# Patient Record
Sex: MALE | Race: WHITE | HISPANIC OR LATINO | URBAN - METROPOLITAN AREA
[De-identification: names, ages, dates, MRNs, and addresses within clinical notes are randomized per-mention and may not be internally consistent; named-entity substitution may affect disease eponyms.]

---

## 2017-12-06 ENCOUNTER — OUTPATIENT (OUTPATIENT)
Dept: OUTPATIENT SERVICES | Facility: HOSPITAL | Age: 57
LOS: 1 days | End: 2017-12-06
Payer: COMMERCIAL

## 2017-12-06 VITALS
DIASTOLIC BLOOD PRESSURE: 94 MMHG | HEIGHT: 64 IN | SYSTOLIC BLOOD PRESSURE: 150 MMHG | TEMPERATURE: 97 F | RESPIRATION RATE: 16 BRPM | WEIGHT: 188.27 LBS | HEART RATE: 80 BPM | OXYGEN SATURATION: 96 %

## 2017-12-06 DIAGNOSIS — K43.9 VENTRAL HERNIA WITHOUT OBSTRUCTION OR GANGRENE: ICD-10-CM

## 2017-12-06 DIAGNOSIS — Z01.818 ENCOUNTER FOR OTHER PREPROCEDURAL EXAMINATION: ICD-10-CM

## 2017-12-06 LAB
HCT VFR BLD CALC: 50.5 % — HIGH (ref 39–50)
HGB BLD-MCNC: 16.3 G/DL — SIGNIFICANT CHANGE UP (ref 13–17)
MCHC RBC-ENTMCNC: 29.4 PG — SIGNIFICANT CHANGE UP (ref 27–34)
MCHC RBC-ENTMCNC: 32.2 GM/DL — SIGNIFICANT CHANGE UP (ref 32–36)
MCV RBC AUTO: 91.2 FL — SIGNIFICANT CHANGE UP (ref 80–100)
PLATELET # BLD AUTO: 227 K/UL — SIGNIFICANT CHANGE UP (ref 150–400)
RBC # BLD: 5.54 M/UL — SIGNIFICANT CHANGE UP (ref 4.2–5.8)
RBC # FLD: 12.9 % — SIGNIFICANT CHANGE UP (ref 10.3–14.5)
WBC # BLD: 7.2 K/UL — SIGNIFICANT CHANGE UP (ref 3.8–10.5)
WBC # FLD AUTO: 7.2 K/UL — SIGNIFICANT CHANGE UP (ref 3.8–10.5)

## 2017-12-06 PROCEDURE — 36415 COLL VENOUS BLD VENIPUNCTURE: CPT

## 2017-12-06 PROCEDURE — 93005 ELECTROCARDIOGRAM TRACING: CPT

## 2017-12-06 PROCEDURE — G0463: CPT

## 2017-12-06 PROCEDURE — 93010 ELECTROCARDIOGRAM REPORT: CPT | Mod: NC

## 2017-12-06 PROCEDURE — 85027 COMPLETE CBC AUTOMATED: CPT

## 2017-12-06 NOTE — H&P PST ADULT - HISTORY OF PRESENT ILLNESS
this is a 56 y/o male who noticed a lump umbilical area about 10 years ago; surgeon suggested repair, but patient had no time; the lump got bigger and now patient has the time for the surgery which will be hernia repair

## 2017-12-06 NOTE — H&P PST ADULT - NSANTHOSAYNRD_GEN_A_CORE
No. SOLEDAD screening performed.  STOP BANG Legend: 0-2 = LOW Risk; 3-4 = INTERMEDIATE Risk; 5-8 = HIGH Risk

## 2017-12-06 NOTE — H&P PST ADULT - FAMILY HISTORY
Mother  Still living? No  Family history of stomach cancer, Age at diagnosis: Age Unknown     Father  Still living? No  Family history of stroke, Age at diagnosis: Age Unknown

## 2017-12-11 NOTE — ASU DISCHARGE PLAN (ADULT/PEDIATRIC). - MEDICATION SUMMARY - MEDICATIONS TO TAKE
I will START or STAY ON the medications listed below when I get home from the hospital:    oxyCODONE-acetaminophen 5 mg-325 mg oral tablet  -- 1 tab(s) by mouth every 6 hours, As Needed -for moderate pain MDD:4   -- Caution federal law prohibits the transfer of this drug to any person other  than the person for whom it was prescribed.  May cause drowsiness.  Alcohol may intensify this effect.  Use care when operating dangerous machinery.  This prescription cannot be refilled.  This product contains acetaminophen.  Do not use  with any other product containing acetaminophen to prevent possible liver damage.  Using more of this medication than prescribed may cause serious breathing problems.    -- Indication: For pain

## 2017-12-11 NOTE — ASU DISCHARGE PLAN (ADULT/PEDIATRIC). - NOTIFY
Persistent Nausea and Vomiting/Inability to Tolerate Liquids or Foods/Swelling that continues/Bleeding that does not stop/Excessive Diarrhea/Increased Irritability or Sluggishness/Fever greater than 101/Pain not relieved by Medications/Unable to Urinate

## 2017-12-14 ENCOUNTER — TRANSCRIPTION ENCOUNTER (OUTPATIENT)
Age: 57
End: 2017-12-14

## 2017-12-14 ENCOUNTER — OUTPATIENT (OUTPATIENT)
Dept: OUTPATIENT SERVICES | Facility: HOSPITAL | Age: 57
LOS: 1 days | End: 2017-12-14
Payer: COMMERCIAL

## 2017-12-14 VITALS
HEIGHT: 64 IN | OXYGEN SATURATION: 98 % | DIASTOLIC BLOOD PRESSURE: 75 MMHG | WEIGHT: 181.22 LBS | RESPIRATION RATE: 16 BRPM | TEMPERATURE: 98 F | HEART RATE: 59 BPM | SYSTOLIC BLOOD PRESSURE: 127 MMHG

## 2017-12-14 VITALS
HEART RATE: 74 BPM | RESPIRATION RATE: 19 BRPM | OXYGEN SATURATION: 97 % | SYSTOLIC BLOOD PRESSURE: 118 MMHG | DIASTOLIC BLOOD PRESSURE: 79 MMHG

## 2017-12-14 DIAGNOSIS — Z98.890 OTHER SPECIFIED POSTPROCEDURAL STATES: Chronic | ICD-10-CM

## 2017-12-14 DIAGNOSIS — K43.9 VENTRAL HERNIA WITHOUT OBSTRUCTION OR GANGRENE: ICD-10-CM

## 2017-12-14 PROCEDURE — 49568: CPT | Mod: AS

## 2017-12-14 PROCEDURE — 49568: CPT

## 2017-12-14 PROCEDURE — C1781: CPT

## 2017-12-14 PROCEDURE — 49561: CPT

## 2017-12-14 PROCEDURE — 49561: CPT | Mod: AS

## 2017-12-14 RX ORDER — OXYCODONE HYDROCHLORIDE 5 MG/1
5 TABLET ORAL ONCE
Qty: 0 | Refills: 0 | Status: DISCONTINUED | OUTPATIENT
Start: 2017-12-14 | End: 2017-12-14

## 2017-12-14 RX ORDER — SODIUM CHLORIDE 9 MG/ML
1000 INJECTION, SOLUTION INTRAVENOUS
Qty: 0 | Refills: 0 | Status: DISCONTINUED | OUTPATIENT
Start: 2017-12-14 | End: 2017-12-15

## 2017-12-14 RX ORDER — HYDROMORPHONE HYDROCHLORIDE 2 MG/ML
0.5 INJECTION INTRAMUSCULAR; INTRAVENOUS; SUBCUTANEOUS
Qty: 0 | Refills: 0 | Status: DISCONTINUED | OUTPATIENT
Start: 2017-12-14 | End: 2017-12-15

## 2017-12-14 RX ORDER — CEFAZOLIN SODIUM 1 G
2000 VIAL (EA) INJECTION ONCE
Qty: 0 | Refills: 0 | Status: COMPLETED | OUTPATIENT
Start: 2017-12-14 | End: 2017-12-14

## 2017-12-14 RX ADMIN — OXYCODONE HYDROCHLORIDE 5 MILLIGRAM(S): 5 TABLET ORAL at 15:30

## 2017-12-14 RX ADMIN — SODIUM CHLORIDE 100 MILLILITER(S): 9 INJECTION, SOLUTION INTRAVENOUS at 14:09

## 2017-12-14 RX ADMIN — SODIUM CHLORIDE 100 MILLILITER(S): 9 INJECTION, SOLUTION INTRAVENOUS at 13:51

## 2017-12-14 RX ADMIN — OXYCODONE HYDROCHLORIDE 5 MILLIGRAM(S): 5 TABLET ORAL at 16:00

## 2017-12-14 RX ADMIN — HYDROMORPHONE HYDROCHLORIDE 0.5 MILLIGRAM(S): 2 INJECTION INTRAMUSCULAR; INTRAVENOUS; SUBCUTANEOUS at 14:25

## 2017-12-14 RX ADMIN — HYDROMORPHONE HYDROCHLORIDE 0.5 MILLIGRAM(S): 2 INJECTION INTRAMUSCULAR; INTRAVENOUS; SUBCUTANEOUS at 14:10

## 2017-12-20 ENCOUNTER — INPATIENT (INPATIENT)
Facility: HOSPITAL | Age: 57
LOS: 1 days | Discharge: HOME | End: 2017-12-22
Attending: HOSPITALIST | Admitting: HOSPITALIST
Payer: COMMERCIAL

## 2017-12-20 VITALS
TEMPERATURE: 99 F | DIASTOLIC BLOOD PRESSURE: 82 MMHG | SYSTOLIC BLOOD PRESSURE: 133 MMHG | OXYGEN SATURATION: 99 % | HEART RATE: 79 BPM | RESPIRATION RATE: 15 BRPM

## 2017-12-20 DIAGNOSIS — Z98.890 OTHER SPECIFIED POSTPROCEDURAL STATES: Chronic | ICD-10-CM

## 2017-12-20 NOTE — ED ADULT TRIAGE NOTE - CHIEF COMPLAINT QUOTE
Pt c/o nausea/vomiting/dizziness/lightheadedness and hiccups s/p hernia surgery approx 1 week ago.  Denies any fevers/chills at home.  Pt endorses epigastric pain.  Denies any SOB, respirations even and unlabored on room air.  PMHx:  hernia surgery, gout

## 2017-12-21 DIAGNOSIS — R06.6 HICCOUGH: ICD-10-CM

## 2017-12-21 DIAGNOSIS — R10.9 UNSPECIFIED ABDOMINAL PAIN: ICD-10-CM

## 2017-12-21 DIAGNOSIS — E78.5 HYPERLIPIDEMIA, UNSPECIFIED: ICD-10-CM

## 2017-12-21 DIAGNOSIS — K46.9 UNSPECIFIED ABDOMINAL HERNIA WITHOUT OBSTRUCTION OR GANGRENE: Chronic | ICD-10-CM

## 2017-12-21 DIAGNOSIS — Z29.9 ENCOUNTER FOR PROPHYLACTIC MEASURES, UNSPECIFIED: ICD-10-CM

## 2017-12-21 LAB
ALBUMIN SERPL ELPH-MCNC: 4.6 G/DL — SIGNIFICANT CHANGE UP (ref 3.3–5)
ALP SERPL-CCNC: 102 U/L — SIGNIFICANT CHANGE UP (ref 40–120)
ALT FLD-CCNC: 28 U/L — SIGNIFICANT CHANGE UP (ref 4–41)
APPEARANCE UR: CLEAR — SIGNIFICANT CHANGE UP
AST SERPL-CCNC: 24 U/L — SIGNIFICANT CHANGE UP (ref 4–40)
BASE EXCESS BLDV CALC-SCNC: 3.6 MMOL/L — SIGNIFICANT CHANGE UP
BASOPHILS # BLD AUTO: 0.06 K/UL — SIGNIFICANT CHANGE UP (ref 0–0.2)
BASOPHILS NFR BLD AUTO: 0.7 % — SIGNIFICANT CHANGE UP (ref 0–2)
BILIRUB SERPL-MCNC: 0.7 MG/DL — SIGNIFICANT CHANGE UP (ref 0.2–1.2)
BILIRUB UR-MCNC: NEGATIVE — SIGNIFICANT CHANGE UP
BLOOD GAS VENOUS - CREATININE: 0.98 MG/DL — SIGNIFICANT CHANGE UP (ref 0.5–1.3)
BLOOD UR QL VISUAL: NEGATIVE — SIGNIFICANT CHANGE UP
BUN SERPL-MCNC: 18 MG/DL — SIGNIFICANT CHANGE UP (ref 7–23)
CALCIUM SERPL-MCNC: 9.6 MG/DL — SIGNIFICANT CHANGE UP (ref 8.4–10.5)
CHLORIDE BLDV-SCNC: 103 MMOL/L — SIGNIFICANT CHANGE UP (ref 96–108)
CHLORIDE SERPL-SCNC: 97 MMOL/L — LOW (ref 98–107)
CO2 SERPL-SCNC: 27 MMOL/L — SIGNIFICANT CHANGE UP (ref 22–31)
COLOR SPEC: YELLOW — SIGNIFICANT CHANGE UP
CREAT SERPL-MCNC: 1.04 MG/DL — SIGNIFICANT CHANGE UP (ref 0.5–1.3)
EOSINOPHIL # BLD AUTO: 0.21 K/UL — SIGNIFICANT CHANGE UP (ref 0–0.5)
EOSINOPHIL NFR BLD AUTO: 2.4 % — SIGNIFICANT CHANGE UP (ref 0–6)
GAS PNL BLDV: 133 MMOL/L — LOW (ref 136–146)
GLUCOSE BLDV-MCNC: 118 — HIGH (ref 70–99)
GLUCOSE SERPL-MCNC: 113 MG/DL — HIGH (ref 70–99)
GLUCOSE UR-MCNC: NEGATIVE — SIGNIFICANT CHANGE UP
HCO3 BLDV-SCNC: 27 MMOL/L — SIGNIFICANT CHANGE UP (ref 20–27)
HCT VFR BLD CALC: 49.9 % — SIGNIFICANT CHANGE UP (ref 39–50)
HCT VFR BLDV CALC: 51.2 % — HIGH (ref 39–51)
HGB BLD-MCNC: 16.2 G/DL — SIGNIFICANT CHANGE UP (ref 13–17)
HGB BLDV-MCNC: 16.7 G/DL — SIGNIFICANT CHANGE UP (ref 13–17)
HYALINE CASTS # UR AUTO: SIGNIFICANT CHANGE UP (ref 0–?)
IMM GRANULOCYTES # BLD AUTO: 0.03 # — SIGNIFICANT CHANGE UP
IMM GRANULOCYTES NFR BLD AUTO: 0.3 % — SIGNIFICANT CHANGE UP (ref 0–1.5)
KETONES UR-MCNC: SIGNIFICANT CHANGE UP
LACTATE BLDV-MCNC: 1.2 MMOL/L — SIGNIFICANT CHANGE UP (ref 0.5–2)
LEUKOCYTE ESTERASE UR-ACNC: NEGATIVE — SIGNIFICANT CHANGE UP
LIDOCAIN IGE QN: 26.7 U/L — SIGNIFICANT CHANGE UP (ref 7–60)
LYMPHOCYTES # BLD AUTO: 1.73 K/UL — SIGNIFICANT CHANGE UP (ref 1–3.3)
LYMPHOCYTES # BLD AUTO: 19.6 % — SIGNIFICANT CHANGE UP (ref 13–44)
MCHC RBC-ENTMCNC: 28.7 PG — SIGNIFICANT CHANGE UP (ref 27–34)
MCHC RBC-ENTMCNC: 32.5 % — SIGNIFICANT CHANGE UP (ref 32–36)
MCV RBC AUTO: 88.3 FL — SIGNIFICANT CHANGE UP (ref 80–100)
MONOCYTES # BLD AUTO: 0.86 K/UL — SIGNIFICANT CHANGE UP (ref 0–0.9)
MONOCYTES NFR BLD AUTO: 9.8 % — SIGNIFICANT CHANGE UP (ref 2–14)
MUCOUS THREADS # UR AUTO: SIGNIFICANT CHANGE UP
NEUTROPHILS # BLD AUTO: 5.92 K/UL — SIGNIFICANT CHANGE UP (ref 1.8–7.4)
NEUTROPHILS NFR BLD AUTO: 67.2 % — SIGNIFICANT CHANGE UP (ref 43–77)
NITRITE UR-MCNC: NEGATIVE — SIGNIFICANT CHANGE UP
NRBC # FLD: 0 — SIGNIFICANT CHANGE UP
PCO2 BLDV: 43 MMHG — SIGNIFICANT CHANGE UP (ref 41–51)
PH BLDV: 7.43 PH — SIGNIFICANT CHANGE UP (ref 7.32–7.43)
PH UR: 5.5 — SIGNIFICANT CHANGE UP (ref 4.6–8)
PLATELET # BLD AUTO: 288 K/UL — SIGNIFICANT CHANGE UP (ref 150–400)
PMV BLD: 11.4 FL — SIGNIFICANT CHANGE UP (ref 7–13)
PO2 BLDV: 50 MMHG — HIGH (ref 35–40)
POTASSIUM BLDV-SCNC: 3.8 MMOL/L — SIGNIFICANT CHANGE UP (ref 3.4–4.5)
POTASSIUM SERPL-MCNC: 4.2 MMOL/L — SIGNIFICANT CHANGE UP (ref 3.5–5.3)
POTASSIUM SERPL-SCNC: 4.2 MMOL/L — SIGNIFICANT CHANGE UP (ref 3.5–5.3)
PROT SERPL-MCNC: 8.1 G/DL — SIGNIFICANT CHANGE UP (ref 6–8.3)
PROT UR-MCNC: NEGATIVE MG/DL — SIGNIFICANT CHANGE UP
RBC # BLD: 5.65 M/UL — SIGNIFICANT CHANGE UP (ref 4.2–5.8)
RBC # FLD: 12.8 % — SIGNIFICANT CHANGE UP (ref 10.3–14.5)
RBC CASTS # UR COMP ASSIST: SIGNIFICANT CHANGE UP (ref 0–?)
SAO2 % BLDV: 84.9 % — SIGNIFICANT CHANGE UP (ref 60–85)
SODIUM SERPL-SCNC: 138 MMOL/L — SIGNIFICANT CHANGE UP (ref 135–145)
SP GR SPEC: 1.02 — SIGNIFICANT CHANGE UP (ref 1–1.04)
SQUAMOUS # UR AUTO: SIGNIFICANT CHANGE UP
UROBILINOGEN FLD QL: NORMAL MG/DL — SIGNIFICANT CHANGE UP
WBC # BLD: 8.81 K/UL — SIGNIFICANT CHANGE UP (ref 3.8–10.5)
WBC # FLD AUTO: 8.81 K/UL — SIGNIFICANT CHANGE UP (ref 3.8–10.5)
WBC UR QL: SIGNIFICANT CHANGE UP (ref 0–?)

## 2017-12-21 PROCEDURE — 99223 1ST HOSP IP/OBS HIGH 75: CPT

## 2017-12-21 PROCEDURE — 74177 CT ABD & PELVIS W/CONTRAST: CPT | Mod: 26

## 2017-12-21 PROCEDURE — 71020: CPT | Mod: 26

## 2017-12-21 RX ORDER — CHLORPROMAZINE HCL 10 MG
25 TABLET ORAL ONCE
Qty: 0 | Refills: 0 | Status: COMPLETED | OUTPATIENT
Start: 2017-12-21 | End: 2017-12-21

## 2017-12-21 RX ORDER — METOCLOPRAMIDE HCL 10 MG
10 TABLET ORAL ONCE
Qty: 0 | Refills: 0 | Status: COMPLETED | OUTPATIENT
Start: 2017-12-21 | End: 2017-12-21

## 2017-12-21 RX ORDER — ACETAMINOPHEN 500 MG
650 TABLET ORAL EVERY 6 HOURS
Qty: 0 | Refills: 0 | Status: DISCONTINUED | OUTPATIENT
Start: 2017-12-21 | End: 2017-12-22

## 2017-12-21 RX ORDER — SODIUM CHLORIDE 9 MG/ML
1000 INJECTION INTRAMUSCULAR; INTRAVENOUS; SUBCUTANEOUS ONCE
Qty: 0 | Refills: 0 | Status: COMPLETED | OUTPATIENT
Start: 2017-12-21 | End: 2017-12-21

## 2017-12-21 RX ORDER — FAMOTIDINE 10 MG/ML
20 INJECTION INTRAVENOUS
Qty: 0 | Refills: 0 | Status: DISCONTINUED | OUTPATIENT
Start: 2017-12-21 | End: 2017-12-22

## 2017-12-21 RX ORDER — ONDANSETRON 8 MG/1
4 TABLET, FILM COATED ORAL EVERY 6 HOURS
Qty: 0 | Refills: 0 | Status: DISCONTINUED | OUTPATIENT
Start: 2017-12-21 | End: 2017-12-22

## 2017-12-21 RX ORDER — ACETAMINOPHEN 500 MG
1000 TABLET ORAL ONCE
Qty: 0 | Refills: 0 | Status: COMPLETED | OUTPATIENT
Start: 2017-12-21 | End: 2017-12-21

## 2017-12-21 RX ORDER — INFLUENZA VIRUS VACCINE 15; 15; 15; 15 UG/.5ML; UG/.5ML; UG/.5ML; UG/.5ML
0.5 SUSPENSION INTRAMUSCULAR ONCE
Qty: 0 | Refills: 0 | Status: DISCONTINUED | OUTPATIENT
Start: 2017-12-21 | End: 2017-12-22

## 2017-12-21 RX ORDER — ONDANSETRON 8 MG/1
4 TABLET, FILM COATED ORAL ONCE
Qty: 0 | Refills: 0 | Status: COMPLETED | OUTPATIENT
Start: 2017-12-21 | End: 2017-12-21

## 2017-12-21 RX ADMIN — SODIUM CHLORIDE 1000 MILLILITER(S): 9 INJECTION INTRAMUSCULAR; INTRAVENOUS; SUBCUTANEOUS at 00:58

## 2017-12-21 RX ADMIN — Medication 10 MILLIGRAM(S): at 05:28

## 2017-12-21 RX ADMIN — Medication 2 MILLIGRAM(S): at 09:47

## 2017-12-21 RX ADMIN — Medication 102 MILLIGRAM(S): at 02:53

## 2017-12-21 RX ADMIN — ONDANSETRON 4 MILLIGRAM(S): 8 TABLET, FILM COATED ORAL at 00:58

## 2017-12-21 RX ADMIN — FAMOTIDINE 20 MILLIGRAM(S): 10 INJECTION INTRAVENOUS at 17:31

## 2017-12-21 NOTE — H&P ADULT - FAMILY HISTORY
Family history of stroke     Mother  Still living? Unknown  Family history of stomach cancer, Age at diagnosis: Age Unknown

## 2017-12-21 NOTE — H&P ADULT - NSHPREVIEWOFSYSTEMS_GEN_ALL_CORE
REVIEW OF SYSTEMS:    CONSTITUTIONAL: No fever, weight loss, or fatigue  EYES: No eye pain, visual disturbances, or discharge  ENMT:  No difficulty hearing, tinnitus, vertigo; No sinus or throat pain  NECK: No pain or stiffness  RESPIRATORY: + cough.. No wheezing, chills or hemoptysis; No shortness of breath  CARDIOVASCULAR: No chest pain, palpitations, dizziness, or leg swelling  GASTROINTESTINAL: + abdominal or epigastric pain (see HPI). + nausea and vomiting. No hematemesis; No diarrhea or constipation. No melena or hematochezia.  GENITOURINARY: No dysuria, frequency, hematuria, or incontinence  NEUROLOGICAL: No headaches or loss of strength  SKIN: No itching, burning, rashes, or lesions. Has healing abdominal wound from surgery   LYMPH NODES: No enlarged glands  MUSCULOSKELETAL: No joint pain or swelling; No muscle, back, or extremity pain  PSYCHIATRIC: No depression, anxiety,   ALLERY AND IMMUNOLOGIC: No allergies to food or meds

## 2017-12-21 NOTE — ED PROVIDER NOTE - OBJECTIVE STATEMENT
58 yo M hx HLD, one week s/p ventral hernia repair, presenting with N/V, burning epigastric pain, and hiccups. Hiccups x 1 week since surgery, n/v x 2 d unable to tolerate PO. Denies fevers/chills/sob. + cough x 2 days, no leg pain/cramps, no dysuria. Normal bowel movement and passing flatus since two days ago.     Surgeon: Aaron Rene  PCP: Ananda Simon 58 yo M hx HLD, one week s/p ventral hernia repair, presenting with N/V, burning epigastric pain, and hiccups. Hiccups x 1 week since surgery, n/v x 2 d unable to tolerate PO. Denies fevers/chills/sob. + cough x 2 days, no leg pain/cramps, no dysuria. Normal bowel movement and passing flatus since two days ago.   Surgeon: Aaron Rene  PCP: Ananda Simonfish: 57M PMH HLD p/w several complaints. s/p elective ventral hernia repair 1w ago ([facility] R[facility]). Had intractable hiccups since then, though on the way over to the ED they got much worse to the point where he was gasping for breath and now completely resolved. Also 2d of NBNB NV. Also upper abd pain, since the surgery, constant, slowly worsening. Last BM this morning, was normal, able to pass gas. Minimal subjective fever. Also cough x1d. Also minimal throat burning since the vomiting. Denies SOB, rhinorrhea, HA, body aches, lightheaded, palpitations, diaphoresis, weakness/numbness, urinary complaints, black/bloody stool. Has not been doing incentive spirometry.

## 2017-12-21 NOTE — ED PROVIDER NOTE - PHYSICAL EXAMINATION
resp: very minimal scattered expiratory wheeze.   abd: soft, +epigastric and LLQ ttp. Also localized ttp around incision site (incision site is supraumbilical, has minimal localized surrounding erythema). +BS. no CVAT  no LE edema, normal equal distal pulses.

## 2017-12-21 NOTE — H&P ADULT - NSHPPHYSICALEXAM_GEN_ALL_CORE
Vital Signs Last 24 Hrs  T(C): 36.7 (21 Dec 2017 08:37), Max: 37.3 (20 Dec 2017 23:54)  T(F): 98.1 (21 Dec 2017 08:37), Max: 99.1 (20 Dec 2017 23:54)  HR: 89 (21 Dec 2017 11:32) (61 - 89)  BP: 113/74 (21 Dec 2017 11:32) (110/79 - 133/82)  BP(mean): --  RR: 18 (21 Dec 2017 11:32) (15 - 18)  SpO2: 97% (21 Dec 2017 11:32) (97% - 100%) Vital Signs Last 24 Hrs  T(C): 36.7 (21 Dec 2017 08:37), Max: 37.3 (20 Dec 2017 23:54)  T(F): 98.1 (21 Dec 2017 08:37), Max: 99.1 (20 Dec 2017 23:54)  HR: 89 (21 Dec 2017 11:32) (61 - 89)  BP: 113/74 (21 Dec 2017 11:32) (110/79 - 133/82)  BP(mean): --  RR: 18 (21 Dec 2017 11:32) (15 - 18)  SpO2: 97% (21 Dec 2017 11:32) (97% - 100%)  PHYSICAL EXAM:  Constitutional: NAD  Eyes: PERRL, EOMI, no scleral icterus or injection  ENMT: supple, no lymphadenopathy, no palpable mass  Respiratory: CTA b/l , no wheezing, or ronchi  Cardiovascular: RRR, +S1/S2, no murmur appreciated   Gastrointestinal: Soft, ND, +BS. + tenderniess at umbilical area around wound site and in b/l lower abdomen. No guarding or rebound.   Extremities: Warm and well perfused   Vascular: +DP/PT pulses   Neurological: AX0x3, Non focal  Skin: unremarkable - abdominal wound site healing. Mild erythema. No discharge or fluctuance.   Psychiatric: Normal mood and affect

## 2017-12-21 NOTE — H&P ADULT - NSHPLABSRESULTS_GEN_ALL_CORE
12-21    138  |  97<L>  |  18  ----------------------------<  113<H>  4.2   |  27  |  1.04    Ca    9.6      21 Dec 2017 00:50    TPro  8.1  /  Alb  4.6  /  TBili  0.7  /  DBili  x   /  AST  24  /  ALT  28  /  AlkPhos  102  12-21 12-21    138  |  97<L>  |  18  ----------------------------<  113<H>  4.2   |  27  |  1.04    Ca    9.6      21 Dec 2017 00:50    TPro  8.1  /  Alb  4.6  /  TBili  0.7  /  DBili  x   /  AST  24  /  ALT  28  /  AlkPhos  102  12-21    Auto Basophil % : 0.7 %    Imaging personally Reviewed:  EXAM:  CT ABDOMEN AND PELVIS OC IC        PROCEDURE DATE:  Dec 21 2017         INTERPRETATION:  CLINICAL INFORMATION: Increased abdominal pain status   post ventral abdominal hernia repair one week prior.    COMPARISON: None available.    PROCEDURE:   CT of the Abdomen and Pelvis was performed with intravenous contrast.   Intravenous contrast: 90 ml Omnipaque 350. 10 ml discarded.  Oral contrast: positive contrast was administered.  Sagittal and coronal reformats were performed.    FINDINGS:    LOWER CHEST: Within normal limits.    LIVER: Within normal limits.  BILE DUCTS: Normal caliber.  GALLBLADDER: Within normal limits.  SPLEEN: Within normal limits.  PANCREAS: Within normal limits.  ADRENALS: Within normal limits.  KIDNEYS/URETERS: Right upper pole indeterminate renal lesion measuring 3   cm. Symmetrical enhancement without hydronephrosis.    BLADDER: Within normal limits.    REPRODUCTIVE ORGANS: The prostate is within normal limits.    BOWEL: No bowel obstruction. Normal appendix.  PERITONEUM: There is a region of mesenteric fat stranding with foci of   gas just deep to the rectus abdominis muscles at the midline. There is no   ascites or fluid collection.  VESSELS:  Within normal limits.  RETROPERITONEUM: No lymphadenopathy.    ABDOMINAL WALL: Ventral abdominal wall stranding with foci of gas, likely   postoperative. No fluid collection.  BONES: Mild degenerative changes of thoracolumbar spine.    IMPRESSION:  Stranding and foci of gas within the abdominal cavity and abdominal wall   at the presumed surgical site without a drainable fluid collection. These   findings may represent normal postoperative change. Please correlate   clinically for superimposed infection.    EXAM:  RAD CHEST PA LAT        PROCEDURE DATE:  Dec 21 2017         INTERPRETATION:  CLINICAL INFORMATION: Cough.    TIME OF EXAMINATION: 12/21/2017 at 1:17 AM    EXAM: PA and Lateral Chest    FINDINGS:      The heart is normal in size.  There is no pleural effusion or pneumothorax.  There is no focal consolidation.    COMPARISON: Chest radiograph 4/12/2016.    IMPRESSION: Clear lungs. 12-21    138  |  97<L>  |  18  ----------------------------<  113<H>  4.2   |  27  |  1.04    Ca    9.6      21 Dec 2017 00:50    TPro  8.1  /  Alb  4.6  /  TBili  0.7  /  DBili  x   /  AST  24  /  ALT  28  /  AlkPhos  102  12-21 12-21    138  |  97<L>  |  18  ----------------------------<  113<H>  4.2   |  27  |  1.04    Ca    9.6      21 Dec 2017 00:50    TPro  8.1  /  Alb  4.6  /  TBili  0.7  /  DBili  x   /  AST  24  /  ALT  28  /  AlkPhos  102  12-21    Auto Basophil % : 0.7 %    Imaging personally Reviewed:  EXAM:  CT ABDOMEN AND PELVIS OC IC        PROCEDURE DATE:  Dec 21 2017         INTERPRETATION:  CLINICAL INFORMATION: Increased abdominal pain status   post ventral abdominal hernia repair one week prior.    COMPARISON: None available.    PROCEDURE:   CT of the Abdomen and Pelvis was performed with intravenous contrast.   Intravenous contrast: 90 ml Omnipaque 350. 10 ml discarded.  Oral contrast: positive contrast was administered.  Sagittal and coronal reformats were performed.    FINDINGS:    LOWER CHEST: Within normal limits.    LIVER: Within normal limits.  BILE DUCTS: Normal caliber.  GALLBLADDER: Within normal limits.  SPLEEN: Within normal limits.  PANCREAS: Within normal limits.  ADRENALS: Within normal limits.  KIDNEYS/URETERS: Right upper pole indeterminate renal lesion measuring 3   cm. Symmetrical enhancement without hydronephrosis.    BLADDER: Within normal limits.    REPRODUCTIVE ORGANS: The prostate is within normal limits.    BOWEL: No bowel obstruction. Normal appendix.  PERITONEUM: There is a region of mesenteric fat stranding with foci of   gas just deep to the rectus abdominis muscles at the midline. There is no   ascites or fluid collection.  VESSELS:  Within normal limits.  RETROPERITONEUM: No lymphadenopathy.    ABDOMINAL WALL: Ventral abdominal wall stranding with foci of gas, likely   postoperative. No fluid collection.  BONES: Mild degenerative changes of thoracolumbar spine.    IMPRESSION:  Stranding and foci of gas within the abdominal cavity and abdominal wall   at the presumed surgical site without a drainable fluid collection. These   findings may represent normal postoperative change. Please correlate   clinically for superimposed infection.    EXAM:  RAD CHEST PA LAT        PROCEDURE DATE:  Dec 21 2017         INTERPRETATION:  CLINICAL INFORMATION: Cough.    TIME OF EXAMINATION: 12/21/2017 at 1:17 AM    EXAM: PA and Lateral Chest    FINDINGS:      The heart is normal in size.  There is no pleural effusion or pneumothorax.  There is no focal consolidation.    COMPARISON: Chest radiograph 4/12/2016.    IMPRESSION: Clear lungs.    12/19 EKG personally reviewed: 78 bpm NSR, no ischemic changes

## 2017-12-21 NOTE — H&P ADULT - PROBLEM SELECTOR PLAN 1
Patient describes umbilical and lower abdominal pain that has been overall stable( exacerbated w/ hiccups) since surgery and is likely 2/2 post op pain. He stopped his pain medications 2 days ago. He also now has reflux symptoms over the past couple days. He denies having this in the past. No alarming signs. CT abd unremarkable.   -Tylenol PRN, can increase to percocet if needed. Would avoid NSAIDS in setting of epigastric burning.   -will start pepcid for reflux symptoms  -serial abdominal exams Patient describes umbilical and lower abdominal pain that has been overall stable( exacerbated w/ hiccups) since surgery and is likely 2/2 post op pain. He stopped his pain medications 2 days ago. He also now has reflux symptoms over the past couple days. He denies having this in the past. No alarming signs. CT abd unremarkable.   -Tylenol PRN, can increase to percocet if needed. Would avoid NSAIDS in setting of epigastric burning.   -will start pepcid for reflux symptoms, if pt not tolerating PO can dry iv protonix   -serial abdominal exams

## 2017-12-21 NOTE — ED PROVIDER NOTE - PSH
Abdominal hernia    H. Pylori Infection  by EGD  History of Colonoscopy  in 2011 was WNL  S/P arthroscopy of left knee

## 2017-12-21 NOTE — ED ADULT NURSE NOTE - OBJECTIVE STATEMENT
alert no distress  c/o dizziness and lightheadedness at present  has had continuos hiccups since umbilical hernia surgery 1 week ago    had and attack of hiccups in car on way to ED and became SOB and hasn't had any since    umbilical sight slightly red with steri strips intact

## 2017-12-21 NOTE — H&P ADULT - PROBLEM SELECTOR PLAN 5
DVT ppx- Low risk. No pharmacological tx warranted at this time.  Encourage ambulation.     ED physicicans disscussed patients case with Dr. Rene's partner today and in house surgery. W/ recs that no surgical eval or work up warranted at the moment.   Patient has appointment w/ Dr. Rene at 2pm tomorrow.     Dispo planning for today or tomorrow if patient can tolerate PO and symptoms remain stable.

## 2017-12-21 NOTE — H&P ADULT - ASSESSMENT
58 yo M hx HLD, s/p recent elective ventral hernia repair last week Thursday, presenting with N/V, burning epigastric pain, and hiccups now improving w/ supportive management and s/p unremarkable CT abd. e

## 2017-12-21 NOTE — ED PROVIDER NOTE - PROGRESS NOTE DETAILS
Klepfish: Pt redeveloped hiccups while in ED, resolved w/ thorazine then recurred. Will give reglan.   CT showing small foci of gas, likely post-operative, no other acute pathology. Pt still w/ abd pain but improved. Re-examined, has localized erythema at incision site (improved from prior), abd is soft, minimally tender. no crepitus/fluctuance. Denies fevers, lightheaded, chills, diaphoresis. wbc wnl, lactate wnl. vitals wnl. Clinically not infectious.  Will reassess. Chaufish: Pt w/ persistent vomit, unable to tolerate PO. Will likely require admission for supportive care. As pt is post-op, attempting to reach primary surgeon dr hill (answering service [phone]). Called and left message w/ service at [phone] and [phone]. updated pt. Conor: No call back from dr. hill. Pt only w/ emesis after PO trials, otherwise no emesis. Given failure to tolerate PO, pt likely requires admission for supportive care. d/w admin dr. latif. Will consult surgery then likely medicine admit for supportive care and possible transfer (if pt still symptomatic) later in the day. d/w surgery. Conor: d/w dr. vu, on call for dr. hill. will attempt to contact dr. hill then get back to us. We can also call him back at [phone] ([place], poss cell phone reception) at ~[phone] if no call back yet. My clinical suspicion for acute surgical pathology is low. Possible dispo is transfer vs. [facility] hospitalist admit vs. dc w/ close outpt f/u.   rediscussed w/ gen surg - no need for consult. luisito valderrama: As per doctor [place] as there is no surgical intervention does not have to be transferred to [place]. can be admitted ot medicine [facility] hospitalist for intractable hiccups. spoke with dr. Gottlieb regarding case, to be admitted to their service. pt stable.

## 2017-12-21 NOTE — H&P ADULT - PROBLEM SELECTOR PLAN 3
Improved. No alarming signs at this time. Patient with no respiratory issues. Diaphragm intact on CXR.   -continue w/ supportive management. Improved. No alarming signs at this time. Patient with no respiratory issues. Diaphragm intact on CXR.   -continue w/ supportive management..

## 2017-12-21 NOTE — ED PROVIDER NOTE - ATTENDING CONTRIBUTION TO CARE
57M PMH HLD, s/p ventral hernia repair 1w ago p/w hiccups (finally resolved on way to ED), slowly worsening abd pain, NV, cough. Vitals wnl, exam as above.  ddx: possible post-op infection (pna, abscess...) clinically not ACS, PE, tamponade, dissection, PTX, myocarditis, mediastinitis.   CBC, cmp, vbg comp, blood cx. EKG. CXR. CT a/p. Symptom control. Reassess,

## 2017-12-21 NOTE — H&P ADULT - PROBLEM SELECTOR PLAN 2
Improved.  However, patient has not tried to eat yet. Likely multifactorial in setting of recent surgery, reflux symptoms and pain. Less likely gastroenteritis. EKG unremarkable.   -zofran PRN  -will start ivfs for now   -advance diet as tolerated

## 2017-12-21 NOTE — H&P ADULT - HISTORY OF PRESENT ILLNESS
58 yo M hx HLD, one week s/p ventral hernia repair, presenting with N/V, burning epigastric pain, and hiccups. Hiccups x 1 week since surgery, n/v x 2 d unable to tolerate PO. Denies fevers/chills/sob. + cough x 2 days, no leg pain/cramps, no dysuria. Normal bowel movement and passing flatus since two days ago. 56 yo M hx HLD, s/p recent elective ventral hernia repair last week Thursday, presenting with N/V, burning epigastric pain, and hiccups. Pt states that hiccups started since surgery. Occurs through out the day and has increased in frequency over the past 2 days. He has been having stable achy/sharp umbilical and lower abdominal pain as well since the surgery, non radiating, 5/10 in severity, that has been overall stable but exacerbated when he has a hiccup. Patient also reports having burning epigastric pain that started 2 days ago as well. He states that it is worse after eating food and laying down.He also feels sour taste at the back of his throat at times. Denies NSAID use. He states that over the past 2 days he has not tolerated anything PO. When he tries he has emesis.  Of note he aslo stopped taking percocet for pain when these symptoms developed and when his hiccups increased in frequency. He Denies fevers/chills/sob, CP, no leg pain/cramps, no dysuria. He is having Normal bowel movement and passing flatus since two days ago. He does report having + cough x 2 days.     In the ED: Temp m 99.1 HR 79 /82 RR 15 sat 99 on RA   Given Thorazine 25mg IV , reglan 10mg IV, zofran 4mg, IV,ativan 2mg, tylenol, and  1L NS bolus    Patient admitted to medicine for inability to tolerate PO     Currently patient states that his hiccups has resolved. However, he has not been able to tolerate PO yet. He still has epigastric burning and lower abdominal pain but mostly only w/ palpation.   Patient states that he has an appointment w/ Dr. Rene tomorrow at 2pm

## 2017-12-21 NOTE — ED PROVIDER NOTE - MEDICAL DECISION MAKING DETAILS
58 yo M hx HLD, one week s/p ventral hernia repair, presenting with N/V, burning epigastric pain, and hiccups, + cough, no fever but abdomen TTP epigastric, umbilical and LLQ, CT abd, CXR to r/o pneumonia (wheezes on exam), U/A r/o UTI. Zofran for nausea, IVF,

## 2017-12-22 ENCOUNTER — TRANSCRIPTION ENCOUNTER (OUTPATIENT)
Age: 57
End: 2017-12-22

## 2017-12-22 VITALS
RESPIRATION RATE: 18 BRPM | SYSTOLIC BLOOD PRESSURE: 115 MMHG | HEART RATE: 78 BPM | OXYGEN SATURATION: 98 % | TEMPERATURE: 98 F | DIASTOLIC BLOOD PRESSURE: 74 MMHG

## 2017-12-22 LAB
CHOLEST SERPL-MCNC: 208 MG/DL — HIGH (ref 120–199)
HBA1C BLD-MCNC: 6.2 % — HIGH (ref 4–5.6)
HDLC SERPL-MCNC: 32 MG/DL — LOW (ref 35–55)
LIPID PNL WITH DIRECT LDL SERPL: 152 MG/DL — SIGNIFICANT CHANGE UP
SPECIMEN SOURCE: SIGNIFICANT CHANGE UP
SPECIMEN SOURCE: SIGNIFICANT CHANGE UP
TRIGL SERPL-MCNC: 148 MG/DL — SIGNIFICANT CHANGE UP (ref 10–149)

## 2017-12-22 RX ORDER — FAMOTIDINE 10 MG/ML
1 INJECTION INTRAVENOUS
Qty: 0 | Refills: 0 | COMMUNITY
Start: 2017-12-22

## 2017-12-22 RX ORDER — ACETAMINOPHEN 500 MG
2 TABLET ORAL
Qty: 0 | Refills: 0 | COMMUNITY
Start: 2017-12-22

## 2017-12-22 RX ADMIN — FAMOTIDINE 20 MILLIGRAM(S): 10 INJECTION INTRAVENOUS at 06:23

## 2017-12-22 RX ADMIN — FAMOTIDINE 20 MILLIGRAM(S): 10 INJECTION INTRAVENOUS at 17:24

## 2017-12-22 NOTE — DISCHARGE NOTE ADULT - MEDICATION SUMMARY - MEDICATIONS TO STOP TAKING
I will STOP taking the medications listed below when I get home from the hospital:    Endocet 5/325 oral tablet  -- 1 tab(s) by mouth every 6 hours

## 2017-12-22 NOTE — DISCHARGE NOTE ADULT - HOSPITAL COURSE
56 yo M hx HLD, s/p recent elective ventral hernia repair last week Thursday, presenting with N/V, burning epigastric pain, and hiccups now improving w/ supportive management and s/p unremarkable CT abd.    Hospital Course:     Abdominal pain.  Plan: Patient describes umbilical and lower abdominal pain that has been overall stable( exacerbated w/ hiccups) since surgery and is likely 2/2 post op pain. He stopped his pain medications 2 days ago. He also now has reflux symptoms over the past couple days. He denies having this in the past. No alarming signs. CT abd unremarkable.   -Tylenol PRN, can increase to percocet if needed. Would avoid NSAIDS in setting of epigastric burning.   -will start pepcid for reflux symptoms, if pt not tolerating PO can dry iv protonix   -serial abdominal exams. Patient describes umbilical and lower abdominal pain that has been overall stable( exacerbated w/ hiccups) since surgery and is likely 2/2 post op pain. He stopped his pain medications 2 days ago. He also now has reflux symptoms over the past couple days. He denies having this in the past. No alarming signs. CT abd unremarkable.   -Tylenol PRN, can increase to percocet if needed. Would avoid NSAIDS in setting of epigastric burning.   -will start pepcid for reflux symptoms  -serial abdominal exams      Problem/Plan - 2:  ·  Problem: R/O Nausea & vomiting.  Plan: Improved.  However, patient has not tried to eat yet. Likely multifactorial in setting of recent surgery, reflux symptoms and pain. Less likely gastroenteritis. EKG unremarkable.   -zofran PRN  -will start ivfs for now   -advance diet as tolerated.      Problem/Plan - 3:  ·  Problem: Hiccups.  Plan: Improved. No alarming signs at this time. Patient with no respiratory issues. Diaphragm intact on CXR.   -continue w/ supportive management.. Improved. No alarming signs at this time. Patient with no respiratory issues. Diaphragm intact on CXR.   -continue w/ supportive management.      Problem/Plan - 4:  ·  Problem: HLD (hyperlipidemia).  Plan: Patient not on any medications   -lipid profile and a1c in the am if pt is still here.      Problem/Plan - 5:  ·  Problem: Prophylactic measure.  Plan: DVT ppx- Low risk. No pharmacological tx warranted at this time.  Encourage ambulation.     ED physicicans discussed patients case with Dr. Rene's partner today and in house surgery. W/ recs that no surgical eval or work up warranted at the moment.     Dispo-stable for discharge home.  Follow up with surgeon Dr Rene next week.

## 2017-12-22 NOTE — DISCHARGE NOTE ADULT - MEDICATION SUMMARY - MEDICATIONS TO TAKE
I will START or STAY ON the medications listed below when I get home from the hospital:    acetaminophen 325 mg oral tablet  -- 2 tab(s) by mouth every 6 hours, As needed, Moderate Pain (4 - 6)  -- Indication: For Pain    famotidine 20 mg oral tablet  -- 1 tab(s) by mouth 2 times a day  -- Indication: For Prophylactic measure

## 2017-12-22 NOTE — DISCHARGE NOTE ADULT - PATIENT PORTAL LINK FT
“You can access the [facility] Patient Portal, offered by [facility], by registering with the following website: [email or web address]”

## 2017-12-22 NOTE — CHART NOTE - NSCHARTNOTEFT_GEN_A_CORE
Pt seen and examined at bedside. Feels well. Tolerated breakfast and lunch.  No chest pain, no shortness of breath.   No overnight event. No N/V/D. No abdominal pain.   Hiccups are better since yesterday.   Pepcid is helping.  Family hx of gastric Ca (mother). Had EGD in the past.  outpt GI follow up.  hx of GERD.  will d/c home with Pepcid BID.     d/w pt and NP Abigail.     - [facility] BOONE [facility]  - [phone]

## 2017-12-22 NOTE — DISCHARGE NOTE ADULT - PLAN OF CARE
Resolved You had recent Hernia repair surgery and developed abdominal pain.  It has resolved.  Follow up with your PCP and surgeon for further medical management within 1 week. Resolved.  Eat small frequent meals. Follow up with your PCP for further medical management and Lipid panel to monitor cholesterol level.

## 2017-12-22 NOTE — DISCHARGE NOTE ADULT - CARE PLAN
Principal Discharge DX:	Abdominal pain  Secondary Diagnosis:	Nausea & vomiting  Secondary Diagnosis:	HLD (hyperlipidemia) Principal Discharge DX:	Abdominal pain  Goal:	Resolved  Instructions for follow-up, activity and diet:	You had recent Hernia repair surgery and developed abdominal pain.  It has resolved.  Follow up with your PCP and surgeon for further medical management within 1 week.  Secondary Diagnosis:	Nausea & vomiting  Instructions for follow-up, activity and diet:	Resolved.  Eat small frequent meals.  Secondary Diagnosis:	HLD (hyperlipidemia)  Instructions for follow-up, activity and diet:	Follow up with your PCP for further medical management and Lipid panel to monitor cholesterol level.

## 2017-12-26 LAB
BACTERIA BLD CULT: SIGNIFICANT CHANGE UP
BACTERIA BLD CULT: SIGNIFICANT CHANGE UP

## 2020-01-20 NOTE — ASU PREOP CHECKLIST - ISOLATION PRECAUTIONS
CERTIFICATE OF Work     1/20/2020      Re: Mayra Phan   [address] [facility] [place] [address]      This is to certify that Mayra Phan was seen as a patient [facility] on [facility]/20 and can return to Work  on 1/21/20.      SIGNATURE:___________________________________________,        JESICA Mendez     [facility], [address] [place] [id number]  Dept Phone: [phone]    
none

## 2023-08-17 NOTE — PATIENT PROFILE ADULT. - FUNCTIONAL SCREEN CURRENT LEVEL: COMMUNICATION, MLM
Simple: Patient demonstrates quick and easy understanding/Verbalized Understanding
(0) understands/communicates without difficulty

## 2025-02-12 NOTE — ASU DISCHARGE PLAN (ADULT/PEDIATRIC). - MODE OF TRANSPORTATION
Upper endoscopy Dr. Holly 2022: Dysphagia  Middle third of the esophagus diverticulum, dilatation with a 55 Syrian Menendez normal stomach, normal duodenum.  Duodenal biopsies unremarkable.  Upper endoscopy 2019 Dr. Holly: Normal esophagus-biopsies negative for EOE and intestinal metaplasia, mid esophageal diverticulum.  Dilation carried out with a 54 Syrian savory normal stomach normal duodenal  Colonoscopy 2016 Dr. Holly:  Good bowel prep, procedure performed without difficulty, moderate diverticulosis noted, grade 1 hemorrhoids, no polyps noted and no specimens collected.  Upper endoscopy 2016 Dr. Holly: Esophageal diverticulum in the lower third, esophageal dilation with 54 Syrian savory.  Normal stomach, multiple gastric polyps-fundic gland polyps on pathology, H. pylori negative gastritis normal duodenum  Colonoscopy 2011   EGD/colonoscopy Dr. Holly 2004:  Esophageal diverticulum, normal GE junction, mild H. pylori negative gastritis, mild sigmoid erythema with unremarkable biopsies, no evidence of diverticulosis, hemorrhoids.  Otherwise normal colonoscopy to the cecum with good colon prep Wheelchair/Stroller

## 2025-04-15 NOTE — PATIENT PROFILE ADULT. - FUNCTIONAL SCREEN CURRENT LEVEL: TRANSFERRING, MLM
Include Z78.9 (Other Specified Conditions Influencing Health Status) As An Associated Diagnosis?: No
Consent: The patient's consent was obtained including but not limited to risks of crusting, scabbing, blistering, scarring, darker or lighter pigmentary change, recurrence, incomplete removal and infection.
Treatment Number (Will Not Render If 0): 0
Anesthesia Volume In Cc: 0.5
Detail Level: Detailed
Post-Care Instructions: I reviewed with the patient in detail post-care instructions. Patient is to wear sunprotection, and avoid picking at any of the treated lesions. Pt may apply Vaseline to crusted or scabbing areas.
Medical Necessity Clause: This procedure was medically necessary because the lesions that were treated were:
Medical Necessity Information: It is in your best interest to select a reason for this procedure from the list below. All of these items fulfill various CMS LCD requirements except the new and changing color options.
(0) independent